# Patient Record
Sex: MALE | Race: WHITE | NOT HISPANIC OR LATINO | Employment: FULL TIME | URBAN - METROPOLITAN AREA
[De-identification: names, ages, dates, MRNs, and addresses within clinical notes are randomized per-mention and may not be internally consistent; named-entity substitution may affect disease eponyms.]

---

## 2018-03-31 ENCOUNTER — HOSPITAL ENCOUNTER (EMERGENCY)
Facility: CLINIC | Age: 33
Discharge: HOME | End: 2018-03-31
Attending: FAMILY MEDICINE | Admitting: FAMILY MEDICINE
Payer: COMMERCIAL

## 2018-03-31 VITALS
SYSTOLIC BLOOD PRESSURE: 145 MMHG | WEIGHT: 240 LBS | TEMPERATURE: 97.2 F | DIASTOLIC BLOOD PRESSURE: 93 MMHG | OXYGEN SATURATION: 95 % | RESPIRATION RATE: 18 BRPM

## 2018-03-31 DIAGNOSIS — H18.892 CORNEAL IRRITATION OF LEFT EYE: ICD-10-CM

## 2018-03-31 PROCEDURE — 90471 IMMUNIZATION ADMIN: CPT | Performed by: FAMILY MEDICINE

## 2018-03-31 PROCEDURE — 99283 EMERGENCY DEPT VISIT LOW MDM: CPT | Mod: Z6 | Performed by: FAMILY MEDICINE

## 2018-03-31 PROCEDURE — 99283 EMERGENCY DEPT VISIT LOW MDM: CPT | Mod: 25 | Performed by: FAMILY MEDICINE

## 2018-03-31 PROCEDURE — 25000125 ZZHC RX 250: Performed by: FAMILY MEDICINE

## 2018-03-31 PROCEDURE — 90715 TDAP VACCINE 7 YRS/> IM: CPT | Performed by: FAMILY MEDICINE

## 2018-03-31 PROCEDURE — 25000128 H RX IP 250 OP 636: Performed by: FAMILY MEDICINE

## 2018-03-31 RX ORDER — TETRACAINE HYDROCHLORIDE 5 MG/ML
1-2 SOLUTION OPHTHALMIC ONCE
Status: COMPLETED | OUTPATIENT
Start: 2018-03-31 | End: 2018-03-31

## 2018-03-31 RX ADMIN — TETRACAINE HYDROCHLORIDE 2 DROP: 5 SOLUTION OPHTHALMIC at 13:25

## 2018-03-31 RX ADMIN — CLOSTRIDIUM TETANI TOXOID ANTIGEN (FORMALDEHYDE INACTIVATED), CORYNEBACTERIUM DIPHTHERIAE TOXOID ANTIGEN (FORMALDEHYDE INACTIVATED), BORDETELLA PERTUSSIS TOXOID ANTIGEN (GLUTARALDEHYDE INACTIVATED), BORDETELLA PERTUSSIS FILAMENTOUS HEMAGGLUTININ ANTIGEN (FORMALDEHYDE INACTIVATED), BORDETELLA PERTUSSIS PERTACTIN ANTIGEN, AND BORDETELLA PERTUSSIS FIMBRIAE 2/3 ANTIGEN 0.5 ML: 5; 2; 2.5; 5; 3; 5 INJECTION, SUSPENSION INTRAMUSCULAR at 13:22

## 2018-03-31 NOTE — ED AVS SNAPSHOT
[facility] Emergency Department    [address] [place] [address]    Phone:  [phone]    Fax:  [phone]                                       Kale Young   MRN: [id number]    Department:  [facility] Emergency Department   Date of Visit:  3/31/2018           After Visit Summary Signature Page     I have received my discharge instructions, and my questions have been answered. I have discussed any challenges I see with this plan with the nurse or doctor.    ..........................................................................................................................................  Patient/Patient Representative Signature      ..........................................................................................................................................  Patient Representative Print Name and Relationship to Patient    ..................................................               ................................................  Date                                            Time    ..........................................................................................................................................  Reviewed by Signature/Title    ...................................................              ..............................................  Date                                                            Time

## 2018-03-31 NOTE — ED AVS SNAPSHOT
[facility] Emergency Department    [address] [place] [address]    Phone:  [phone]    Fax:  [phone]                                       Kale Young   MRN: [id number]    Department:  [facility] Emergency Department   Date of Visit:  3/31/2018           Patient Information     Date Of Birth          1985        Your diagnoses for this visit were:     Corneal irritation of left eye        You were seen by Tank Samaniego MD.      Follow-up Information     Schedule an appointment as soon as possible for a visit with Clinic, [facility].    Why:  For any further concerns    Contact information:    [address], [address]  [place] [address]  [phone]        Discharge References/Attachments     CORNEA, FOREIGN OBJECT IN (ENGLISH)    EYE, FLASH BURN TO (ENGLISH)      24 Hour Appointment Hotline       To make an appointment at any [facility], call [phone] ([phone]). If you don't have a family doctor or clinic, we will help you find one. [facility] ([facility] are conveniently located to serve the needs of you and your family.             Review of your medicines      Notice     You have not been prescribed any medications.            Procedures and tests performed during your visit     Patient care order      Orders Needing Specimen Collection     None      Pending Results     No orders found from 3/29/2018 to 4/1/2018.            Pending Culture Results     No orders found from 3/29/2018 to 4/1/2018.            Pending Results Instructions     If you had any lab results that were not finalized at the time of your Discharge, you can call the ED Lab Result RN at [phone]. You will be contacted by this team for any positive Lab results or changes in treatment. The nurses are available 7 days a week from 10A to 6:30P.  You can leave a message 24 hours per day and they will return your call.        Thank you for choosing [facility]      "  Thank you for choosing [facility] for your care. Our goal is always to provide you with excellent care. Hearing back from our patients is one way we can continue to improve our services. Please take a few minutes to complete the written survey that you may receive in the mail after you visit with us. Thank you!        [employer] Information     [employer] lets you send messages to your doctor, view your test results, renew your prescriptions, schedule appointments and more. To sign up, go to [email or web address] . Click on \"Log in\" on the left side of the screen, which will take you to the Welcome page. Then click on \"Sign up Now\" on the right side of the page.     You will be asked to enter the access code listed below, as well as some personal information. Please follow the directions to create your username and password.     Your access code is: [email or web address]  Expires: 2018  1:30 PM     Your access code will  in 90 days. If you need help or a new code, please call your [facility] clinic or [phone].        Care EveryWhere ID     This is your Care EveryWhere ID. This could be used by other organizations to access your [facility] medical records  [id number]        Equal Access to Services     KIRAN MUÑOZ : Alvina garciao Sodave, waaxda luqadaha, qaybta kaalmada adeegyada, diallo salinas. So [facility] [phone].    ATENCIÓN: Si habla español, tiene a munoz disposición servicios gratuitos de asistencia lingüística. Llame al [phone].    We comply with applicable federal civil rights laws and [place] laws. We do not discriminate on the basis of race, color, national origin, age, disability, sex, sexual orientation, or gender identity.            After Visit Summary       This is your record. Keep this with you and show to your community pharmacist(s) and doctor(s) at your next visit.                  "

## 2018-03-31 NOTE — ED PROVIDER NOTES
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  Patient felt like he had something stuck in his eye.  He took his fingernail and felt like he was pulling pieces out of his eye this morning.  It is feeling a little bit better here now.  Patient does not wear contact lenses.  Patient does not remember when his last tetanus shot was.  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    Problem List:    There are no active problems to display for this patient.       Past Medical History:    History reviewed. No pertinent past medical history.    Past Surgical History:    History reviewed. No pertinent surgical history.    Family History:    No family history on file.    Social History:  Marital Status:    Social History   Substance Use Topics     Smoking status: Never Smoker     Smokeless tobacco: Current User     Alcohol use Yes      Comment: occ        Medications:      No current outpatient prescriptions on file.      Review of Systems   All other systems reviewed and are negative.      Physical Exam   BP: (!) 145/93  Heart Rate: 81  Temp: 97.2  F (36.2  C)  Resp: 18  Weight: 108.9 kg (240 lb)  SpO2: 95 %      Physical Exam   Constitutional: He appears well-developed and well-nourished. No distress.   HENT:   Head: Normocephalic and atraumatic.   Mouth/Throat: No oropharyngeal exudate.   Eyes: Conjunctivae, EOM and lids are normal. Pupils are equal, round, and reactive to light. Lids are everted and swept, no foreign bodies found. Right eye exhibits no exudate. No foreign body present in the right eye. Left eye exhibits no exudate. No foreign body present in the left eye.   Slit lamp exam:       The left eye shows no corneal abrasion, no corneal ulcer, no foreign body and no fluorescein uptake.    Skin: He is not diaphoretic.   Nursing note and vitals reviewed.      ED Course     ED Course     Procedures          Medications   tetracaine (PONTOCAINE) 0.5 % ophthalmic solution 1-2 drop (not administered)     After thorough eye exam, I cannot find any signs of foreign bodies in the eye.  There is no fluorescein uptake.  Eyelids were everted and no foreign bodies noted.  I was irrigated here in the emergency department.  Patient will use ibuprofen as needed for pain and moisturizing drops to help with eye irritation.  Patient will follow-up with ophthalmology if there is still any concerns after a few days.    Assessments & Plan (with Medical Decision Making)  Corneal irritation     I have reviewed the nursing notes.    I have reviewed the findings, diagnosis, plan and need for follow up with the patient.        3/31/2018   [facility] EMERGENCY DEPARTMENT     Tank Samaniego MD  03/31/18 1253

## 2018-03-31 NOTE — ED NOTES
Attempted to irrigate eye. Pt refused stating he does not like anyone messing with his eyes. Supplies sent with pt-wife to irrigate if needed

## 2018-07-22 ENCOUNTER — HOSPITAL ENCOUNTER (EMERGENCY)
Facility: CLINIC | Age: 33
Discharge: HOME | End: 2018-07-22
Attending: PHYSICIAN ASSISTANT | Admitting: PHYSICIAN ASSISTANT
Payer: COMMERCIAL

## 2018-07-22 VITALS
OXYGEN SATURATION: 98 % | SYSTOLIC BLOOD PRESSURE: 143 MMHG | TEMPERATURE: 98.6 F | WEIGHT: 215 LBS | RESPIRATION RATE: 16 BRPM | HEART RATE: 74 BPM | DIASTOLIC BLOOD PRESSURE: 91 MMHG

## 2018-07-22 DIAGNOSIS — S39.012A STRAIN OF LUMBAR REGION, INITIAL ENCOUNTER: ICD-10-CM

## 2018-07-22 PROCEDURE — 99284 EMERGENCY DEPT VISIT MOD MDM: CPT | Performed by: PHYSICIAN ASSISTANT

## 2018-07-22 PROCEDURE — 96372 THER/PROPH/DIAG INJ SC/IM: CPT | Performed by: PHYSICIAN ASSISTANT

## 2018-07-22 PROCEDURE — 25000128 H RX IP 250 OP 636: Performed by: PHYSICIAN ASSISTANT

## 2018-07-22 PROCEDURE — 99284 EMERGENCY DEPT VISIT MOD MDM: CPT | Mod: Z6 | Performed by: PHYSICIAN ASSISTANT

## 2018-07-22 RX ORDER — KETOROLAC TROMETHAMINE 30 MG/ML
60 INJECTION, SOLUTION INTRAMUSCULAR; INTRAVENOUS ONCE
Status: COMPLETED | OUTPATIENT
Start: 2018-07-22 | End: 2018-07-22

## 2018-07-22 RX ORDER — CYCLOBENZAPRINE HCL 10 MG
10 TABLET ORAL 3 TIMES DAILY PRN
Qty: 20 TABLET | Refills: 0 | Status: SHIPPED | OUTPATIENT
Start: 2018-07-22 | End: 2018-07-28

## 2018-07-22 RX ADMIN — KETOROLAC TROMETHAMINE 60 MG: 30 INJECTION, SOLUTION INTRAMUSCULAR at 14:05

## 2018-07-22 NOTE — DISCHARGE INSTRUCTIONS
Please take the Flexeril as needed for muscle spasming. I encourage you to also take ibuprofen 800 mg every 8 hours for pain and anti-inflammatory relief- no ibuprofen until 8 pm tonight because you received toradol here.  Apply ice to the low back and practice gentle stretching and massage to the area.  Return to normal activities as tolerated.  If you develop worsening symptoms please return to the emergency department.    Thank you for choosing [facility] Emergency Department. It was a pleasure taking care of you today. If you have any questions, please call [phone].    Chiquita Rice PA-C    Understanding Lumbosacral Strain    Lumbosacral strain is a medical term for an injury that causes low back pain. The lumbosacral area (low back) is between the bottom of the ribcage and the top of the buttocks. A strain is tearing of muscles and tendons. These tears can be very small but still cause pain.  How a lumbosacral strain happens  Muscles and tendons connected to the spine can be strained in a number of ways:    Sitting or standing in the same position for long periods of time. This can harm the low back over time. Poor posture can make low back pain more likely.    Moving the muscles and tendons past their usual range of motion. This can cause a sudden injury. This can happen when you twist, bend over, or lift something heavy. Not using correct technique for sports or tasks like lifting can make back injury more likely.    Accidents or falls  Lumbosacral strain can be caused by other problems, but these are less common.  Symptoms of lumbosacral strain  Symptoms may include:    Pain in the back, often on one side    Pain that gets worse with movement and gets better with rest    Inability to move as freely as usual    Swelling, slight redness, and skin warmth in the painful area  Treatment for lumbosacral strain  Low back pain often goes away by itself within several weeks. But it often comes back.  Treatment focuses on reducing pain and avoiding further injury. Bed rest is usually not recommended for low back pain. Treatments may include:    Avoiding or changing the action that caused the problem. This helps prevent injuring the tissues again.    Prescription or over-the-counter pain medicines. These help reduce inflammation, swelling, and pain.    Cold or heat packs. These help reduce pain and swelling.    Stretching and other exercises. These improve flexibility and strength.    Physical therapy. This usually includes exercises and other treatments.    Injections of medicine. This may relieve symptoms.  If these treatments do not relieve symptoms, your healthcare provider may order imaging tests to learn more about the problem. Sometimes you may need surgery.  Possible complications of lumbosacral strain  If the cause of the pain is not addressed, symptoms may return or get worse. Follow your healthcare provider s instructions on lifestyle changes and treating your back.     When to call your healthcare provider  Call your healthcare provider right away if you have any of these:    Fever of 100.4 F (38 C) or higher, or as directed    Numbness, tingling, or weakness    Problems with bowel or bladder control, or problems having sex    Pain that does not go away, or gets worse    New symptoms

## 2018-07-22 NOTE — ED TRIAGE NOTES
Pt here with low back pain, lifting on Tuesday, then long car ride, difficulty getting out of bed this morning.

## 2018-07-22 NOTE — ED AVS SNAPSHOT
[facility] Emergency Department    [address] [place] [address]    Phone:  [phone]    Fax:  [phone]                                       Kale Young   MRN: [id number]    Department:  [facility] Emergency Department   Date of Visit:  7/22/2018           After Visit Summary Signature Page     I have received my discharge instructions, and my questions have been answered. I have discussed any challenges I see with this plan with the nurse or doctor.    ..........................................................................................................................................  Patient/Patient Representative Signature      ..........................................................................................................................................  Patient Representative Print Name and Relationship to Patient    ..................................................               ................................................  Date                                            Time    ..........................................................................................................................................  Reviewed by Signature/Title    ...................................................              ..............................................  Date                                                            Time

## 2018-07-22 NOTE — ED AVS SNAPSHOT
[facility] Emergency Department    [address] [place] [address]    Phone:  [phone]    Fax:  [phone]                                       Kale Young   MRN: [id number]    Department:  [facility] Emergency Department   Date of Visit:  7/22/2018           Patient Information     Date Of Birth          1985        Your diagnoses for this visit were:     Strain of lumbar region, initial encounter        You were seen by Chiquita Rice PA-C.      Follow-up Information     Follow up with [facility] Emergency Department.    Specialty:  EMERGENCY MEDICINE    Why:  If symptoms worsen    Contact information:    [address] [place] [address] [phone]    Additional information:    From [address]: Exit at [address] on [address] side of [place]. Turn right on [address]. Turn left at stoplight on [address] to [address]. [facility] will be in view two blocks ahead        Discharge Instructions       Please take the Flexeril as needed for muscle spasming. I encourage you to also take ibuprofen 800 mg every 8 hours for pain and anti-inflammatory relief- no ibuprofen until 8 pm tonight because you received toradol here.  Apply ice to the low back and practice gentle stretching and massage to the area.  Return to normal activities as tolerated.  If you develop worsening symptoms please return to the emergency department.    Thank you for choosing [facility] Emergency Department. It was a pleasure taking care of you today. If you have any questions, please call [phone].    Chiquita Rice PA-C    Understanding Lumbosacral Strain    Lumbosacral strain is a medical term for an injury that causes low back pain. The lumbosacral area (low back) is between the bottom of the ribcage and the top of the buttocks. A strain is tearing of muscles and tendons. These tears can be very small but still cause pain.  How a lumbosacral  strain happens  Muscles and tendons connected to the spine can be strained in a number of ways:    Sitting or standing in the same position for long periods of time. This can harm the low back over time. Poor posture can make low back pain more likely.    Moving the muscles and tendons past their usual range of motion. This can cause a sudden injury. This can happen when you twist, bend over, or lift something heavy. Not using correct technique for sports or tasks like lifting can make back injury more likely.    Accidents or falls  Lumbosacral strain can be caused by other problems, but these are less common.  Symptoms of lumbosacral strain  Symptoms may include:    Pain in the back, often on one side    Pain that gets worse with movement and gets better with rest    Inability to move as freely as usual    Swelling, slight redness, and skin warmth in the painful area  Treatment for lumbosacral strain  Low back pain often goes away by itself within several weeks. But it often comes back. Treatment focuses on reducing pain and avoiding further injury. Bed rest is usually not recommended for low back pain. Treatments may include:    Avoiding or changing the action that caused the problem. This helps prevent injuring the tissues again.    Prescription or over-the-counter pain medicines. These help reduce inflammation, swelling, and pain.    Cold or heat packs. These help reduce pain and swelling.    Stretching and other exercises. These improve flexibility and strength.    Physical therapy. This usually includes exercises and other treatments.    Injections of medicine. This may relieve symptoms.  If these treatments do not relieve symptoms, your healthcare provider may order imaging tests to learn more about the problem. Sometimes you may need surgery.  Possible complications of lumbosacral strain  If the cause of the pain is not addressed, symptoms may return or get worse. Follow your healthcare provider s  instructions on lifestyle changes and treating your back.     When to call your healthcare provider  Call your healthcare provider right away if you have any of these:    Fever of 100.4 F (38 C) or higher, or as directed    Numbness, tingling, or weakness    Problems with bowel or bladder control, or problems having sex    Pain that does not go away, or gets worse    New symptoms         24 Hour Appointment Hotline       To make an appointment at any [facility] clinic, call [phone] ([phone]). If you don't have a family doctor or clinic, we will help you find one. [facility] clinics are conveniently located to serve the needs of you and your family.             Review of your medicines      START taking        Dose / Directions Last dose taken    cyclobenzaprine 10 MG tablet   Commonly known as:  FLEXERIL   Dose:  10 mg   Quantity:  20 tablet        Take 1 tablet (10 mg) by mouth 3 times daily as needed for muscle spasms   Refills:  0          Our records show that you are taking the medicines listed below. If these are incorrect, please call your family doctor or clinic.        Dose / Directions Last dose taken    IBUPROFEN PO   Dose:  800 mg        Take 800 mg by mouth every 8 hours as needed for moderate pain   Refills:  0                Prescriptions were sent or printed at these locations (1 Prescription)                   [facility] [place] [address] [address]    [address] [facility] [address]    Telephone:  [phone]   Fax:  [phone]   Hours:                  E-Prescribed (1 of 1)         cyclobenzaprine (FLEXERIL) 10 MG tablet                Orders Needing Specimen Collection     None      Pending Results     No orders found from 7/20/2018 to 7/23/2018.            Pending Culture Results     No orders found from 7/20/2018 to 7/23/2018.            Pending Results Instructions     If you had any lab results that were not finalized at the time of your Discharge, you  "can call the ED Lab Result RN at [phone]. You will be contacted by this team for any positive Lab results or changes in treatment. The nurses are available 7 days a week from 10A to 6:30P.  You can leave a message 24 hours per day and they will return your call.        Thank you for choosing [facility]       Thank you for choosing [facility] for your care. Our goal is always to provide you with excellent care. Hearing back from our patients is one way we can continue to improve our services. Please take a few minutes to complete the written survey that you may receive in the mail after you visit with us. Thank you!        [employer] Information     [employer] lets you send messages to your doctor, view your test results, renew your prescriptions, schedule appointments and more. To sign up, go to [email or web address] . Click on \"Log in\" on the left side of the screen, which will take you to the Welcome page. Then click on \"Sign up Now\" on the right side of the page.     You will be asked to enter the access code listed below, as well as some personal information. Please follow the directions to create your username and password.     Your access code is: [email or web address]  Expires: 10/20/2018  2:09 PM     Your access code will  in 90 days. If you need help or a new code, please call your [facility] clinic or [phone].        Care EveryWhere ID     This is your Care EveryWhere ID. This could be used by other organizations to access your [facility] medical records  [id number]        Equal Access to Services     KRIAN MUÑOZ : Hadii jasmyn ku hadasho Sonickali, waaxda luqadaha, qaybta kaalmada adeegyada, diallo dunn . So [facility] [phone].    ATENCIÓN: Si habla español, tiene a munoz disposición servicios gratuitos de asistencia lingüística. Llame al [phone].    We comply with applicable federal civil rights laws and [place] laws. We do not discriminate on the basis of race, color, national " origin, age, disability, sex, sexual orientation, or gender identity.            After Visit Summary       This is your record. Keep this with you and show to your community pharmacist(s) and doctor(s) at your next visit.

## 2018-07-22 NOTE — LETTER
July 22, 2018      Kale Young  [address]   [place] [address]        To Whom It May Concern:    Kale Young  was seen on 7/22/18.  Please excuse him from work for the next 2-3 days due to injury.        Sincerely,          Chiquita Rice PA-C

## 2018-07-22 NOTE — ED PROVIDER NOTES
History     Chief Complaint   Patient presents with     Back Pain     HPI  Kale Young is a 33 year old male who presents to the emergency department complaining of back pain.  Patient reports about 5 days ago he was lifting large beams for work.  After that he noticed his back was really tight and sore.  Yesterday he drove from [place] back to here which is about 10 hours in the car ride really tightened up his back further.  When he woke up this morning he could barely get out of bed due to how sore it was.  He took 800 mg ibuprofen last night but otherwise nothing.  He denies any direct injury to the back.  Denies numbness or weakness in legs.  No loss of bowel or bladder control.  No fevers, nausea or vomiting.  No urinary symptoms.    Problem List:    There are no active problems to display for this patient.       Past Medical History:    History reviewed. No pertinent past medical history.    Past Surgical History:    History reviewed. No pertinent surgical history.    Family History:    No family history on file.    Social History:  Marital Status:   [2]  Social History   Substance Use Topics     Smoking status: Never Smoker     Smokeless tobacco: Current User     Alcohol use Yes      Comment: occ        Medications:      cyclobenzaprine (FLEXERIL) 10 MG tablet   IBUPROFEN PO         Review of Systems   All other systems reviewed and are negative.      Physical Exam   BP: (!) 147/94  Pulse: 71  Temp: 98.6  F (37  C)  Resp: 16  Weight: 97.5 kg (215 lb)  SpO2: 98 %      Physical Exam   Constitutional: He is oriented to person, place, and time. He appears well-developed and well-nourished. No distress.   HENT:   Head: Normocephalic and atraumatic.   Eyes: Conjunctivae are normal.   Neck: Normal range of motion.   Pulmonary/Chest: Effort normal. No respiratory distress.   Musculoskeletal:        Cervical back: Normal.        Thoracic back: Normal.        Lumbar back: He exhibits decreased range of  motion, tenderness (area noted) and spasm (area noted). He exhibits no bony tenderness.        Back:    Neurological: He is alert and oriented to person, place, and time.   Skin: Skin is warm. He is not diaphoretic.   Psychiatric: He has a normal mood and affect.   Nursing note and vitals reviewed.      ED Course     ED Course     Procedures    No results found for this or any previous visit (from the past 24 hour(s)).    Medications   ketorolac (TORADOL) injection 60 mg (not administered)       Assessments & Plan (with Medical Decision Making)  Kale Young is a 33 year old male who presented to the ED complaining of back pain.  Developed over the last several days after lifting heavy objects and riding in a vehicle for a long time.  No acute injury.  Vitals unremarkable on arrival.  He had bilateral muscular tenderness to the low back area but otherwise normal strength in lower extremities and normal sensation.  No warning neurological symptoms to suggest indication for imaging.  I think patient likely has a lumbar strain with spasming.  He was given Toradol IM here for acute relief.  He felt comfortable managing things at home with muscle relaxers and continued use of NSAIDs, flexeril prescribed. Also encouraged ice to the low back, gentle stretching and massage.  Anticipate things to improve in the next few days to a week, can follow up in the clinic if symptoms persist.  I went over indications of when to return to the ED.  All questions answered and patient agreeable to plan and to discharge.     I have reviewed the nursing notes.    I have reviewed the findings, diagnosis, plan and need for follow up with the patient.    New Prescriptions    CYCLOBENZAPRINE (FLEXERIL) 10 MG TABLET    Take 1 tablet (10 mg) by mouth 3 times daily as needed for muscle spasms       Final diagnoses:   Strain of lumbar region, initial encounter     Note: Chart documentation done in part with Dragon Voice Recognition software.  Although reviewed after completion, some word and grammatical errors may remain.      7/22/2018   [facility] EMERGENCY DEPARTMENT     Chiquita Rice PA-C  07/22/18 9149

## 2018-07-25 NOTE — ED PROVIDER NOTES
3:17 PM     Patient presents to the ED for filling out of the [place] unemployment insurance forms.  He was evaluated in the ED on 7/22/18 by Chiquita Rice PA-C.  Chiquita is not in the ED for another week, and he was hoping that this form could be filled out prior to then.  With his approval, I opened his chart to review it.  Patient was in for a lumbar strain.  Was given a note for work to be off work for 3 days.  I filled out the unemployment insurance form with the treatment date of 7/22/18 and being  totally unable to perform any type of work through 7/25/2018.  This was filled out given that Chiquita Rice PA-C is not available to fill out the form in a timely fashion.    Electronically signed by Sen Vyas PA-C.      Sen Vyas PA-C  07/25/18 4620

## 2018-08-15 ENCOUNTER — OFFICE VISIT (OUTPATIENT)
Dept: FAMILY MEDICINE | Facility: CLINIC | Age: 33
End: 2018-08-15
Payer: COMMERCIAL

## 2018-08-15 VITALS
RESPIRATION RATE: 16 BRPM | DIASTOLIC BLOOD PRESSURE: 84 MMHG | HEART RATE: 78 BPM | SYSTOLIC BLOOD PRESSURE: 132 MMHG | WEIGHT: 218 LBS | TEMPERATURE: 97 F | OXYGEN SATURATION: 99 %

## 2018-08-15 DIAGNOSIS — S33.5XXA LUMBAR SPRAIN, INITIAL ENCOUNTER: Primary | ICD-10-CM

## 2018-08-15 PROCEDURE — 99213 OFFICE O/P EST LOW 20 MIN: CPT | Performed by: OBSTETRICS & GYNECOLOGY

## 2018-08-15 ASSESSMENT — PAIN SCALES - GENERAL: PAINLEVEL: MILD PAIN (3)

## 2018-08-15 NOTE — MR AVS SNAPSHOT
"              After Visit Summary   8/15/2018    Kale Young    MRN: [id number]           Patient Information     Date Of Birth          1985        Visit Information        Provider Department      8/15/2018 8:50 AM Rafiq Schwarz MD [facility]        Today's Diagnoses     Lumbar sprain, initial encounter    -  1       Follow-ups after your visit        Your next 10 appointments already scheduled     Aug 21, 2018 10:20 AM CDT   [facility]    [address] [place] [id number] [phone]           Bring a current list of meds and any records pertaining to this visit. For Physicals, please bring immunization records and any forms needing to be filled out. Please arrive 10 minutes early to complete paperwork.              Who to contact     If you have questions or need follow up information about today's clinic visit or your schedule please contact [facility] directly at [phone].  Normal or non-critical lab and imaging results will be communicated to you by Ulmarthart, letter or phone within 4 business days after the clinic has received the results. If you do not hear from us within 7 days, please contact the clinic through iSTARt or phone. If you have a critical or abnormal lab result, we will notify you by phone as soon as possible.  Submit refill requests through FatSkunk or call your pharmacy and they will forward the refill request to us. Please allow 3 business days for your refill to be completed.          Additional Information About Your Visit        UlmartharJintronix Information     FatSkunk lets you send messages to your doctor, view your test results, renew your prescriptions, schedule appointments and more. To sign up, go to [email or web address] . Click on \"Log in\" on the left side of the screen, which will take you to the Welcome page. Then click on \"Sign " "up Now\" on the right side of the page.     You will be asked to enter the access code listed below, as well as some personal information. Please follow the directions to create your username and password.     Your access code is: [email or web address]  Expires: 10/20/2018  2:09 PM     Your access code will  in 90 days. If you need help or a new code, please call your [facility] or [phone].        Care EveryWhere ID     This is your Care EveryWhere ID. This could be used by other organizations to access your [place] medical records  [id number]        Your Vitals Were     Pulse Temperature Respirations Pulse Oximetry          78 97  F (36.1  C) (Temporal) 16 99%         Blood Pressure from Last 3 Encounters:   08/15/18 132/84   18 (!) 143/91   18 (!) 145/93    Weight from Last 3 Encounters:   08/15/18 218 lb (98.9 kg)   18 215 lb (97.5 kg)   18 240 lb (108.9 kg)              Today, you had the following     No orders found for display       Primary Care Provider Office Phone # Fax #    [facility] [phone]       [address] [address]        Equal Access to Services     KIRAN MUÑOZ AH: Hadii aad ku hadasho Soomaali, waaxda luqadaha, qaybta kaalmada adeegyada, waxay idiin hayaan roxie dunn . So [facility] [phone].    ATENCIÓN: Si habla español, tiene a munoz disposición servicios gratuitos de asistencia lingüística. Llame al [phone].    We comply with applicable federal civil rights laws and [place] laws. We do not discriminate on the basis of race, color, national origin, age, disability, sex, sexual orientation, or gender identity.            Thank you!     Thank you for choosing [facility]  for your care. Our goal is always to provide you with excellent care. Hearing back from our patients is one way we can continue to improve our services. Please take a few minutes to complete the written survey that you " may receive in the mail after your visit with us. Thank you!             Your Updated Medication List - Protect others around you: Learn how to safely use, store and throw away your medicines at www.disposemymeds.org.          This list is accurate as of 8/15/18 10:45 AM.  Always use your most recent med list.                   Brand Name Dispense Instructions for use Diagnosis    IBUPROFEN PO      Take 800 mg by mouth every 8 hours as needed for moderate pain

## 2018-08-15 NOTE — PROGRESS NOTES
Subjective:  DOI: July 18th 2018  Employer: [employer]  Description of injury:  He was lifting heavy I beams when he felt soreness in his back. He went to ER and was prescribed ibuprofen and flexeril, which helped a lot. Here for followup now. He states that his pain was always confined to the bilateral lower back, not in his legs. No numbness in legs. No motor dysfunction or change in bowel or bladder control.      Now his symptoms are minimal- he uses his back as usual and has occasional pain but overall doing well. Pain markedly decreased.  Back to normal activity.    The past medical history, social history, past surgical history and family history as shown below have been reviewed by me today.  History reviewed. No pertinent past medical history.   No Known Allergies  Current Outpatient Prescriptions   Medication Sig Dispense Refill     IBUPROFEN PO Take 800 mg by mouth every 8 hours as needed for moderate pain       History reviewed. No pertinent surgical history.  Social History     Social History     Marital status:      Spouse name: N/A     Number of children: N/A     Years of education: N/A     Social History Main Topics     Smoking status: Never Smoker     Smokeless tobacco: Current User     Alcohol use Yes      Comment: occ     Drug use: No     Sexual activity: Not Asked     Other Topics Concern     None     Social History Narrative     History reviewed. No pertinent family history.    ROS: A 12 point review of systems was done. Except for what is listed above in the HPI, the systems review is negative .      Objective: Vital signs: Blood pressure 132/84, pulse 78, temperature 97  F (36.1  C), temperature source Temporal, resp. rate 16, weight 218 lb (98.9 kg), SpO2 99 %.    Chest is clear to auscultation.  No wheezes, rales or rhonchi heard.  Cardiac exam is normal with s1, s2, no murmurs or adventitious sounds.Normal rate and rhythm is heard.   The examination of the back reveals  normal   ROM with forward flexion at the waist. except for some limitation of flexion in the lower lumbar muscles- some minimal spasm noted.  there is minimal tenderness to palpation of the lumbar   muscles bilaterally  . SLR testing is  70 degrees on the left leg and  70 degrees on the right leg.  DTRs are 2  + /4+ on the left and right knee . Motor and sensory exams of the legs are normal.           Assessment/Plan:    1.  Bilateral lower back strain with lumbar spasm- markedly improved. He can return to work without restrictions. I demonstrated proper lifting and stretching exercises to him.    2. rechekc prn.      LUCILLE Schwarz MD

## 2018-08-21 ENCOUNTER — OFFICE VISIT (OUTPATIENT)
Dept: FAMILY MEDICINE | Facility: CLINIC | Age: 33
End: 2018-08-21
Payer: COMMERCIAL

## 2018-08-21 VITALS
RESPIRATION RATE: 16 BRPM | BODY MASS INDEX: 34.53 KG/M2 | DIASTOLIC BLOOD PRESSURE: 84 MMHG | WEIGHT: 220 LBS | SYSTOLIC BLOOD PRESSURE: 122 MMHG | OXYGEN SATURATION: 96 % | HEART RATE: 83 BPM | TEMPERATURE: 97 F | HEIGHT: 67 IN

## 2018-08-21 DIAGNOSIS — Z30.8 ENCOUNTER FOR OTHER CONTRACEPTIVE MANAGEMENT: Primary | ICD-10-CM

## 2018-08-21 PROCEDURE — 99214 OFFICE O/P EST MOD 30 MIN: CPT | Performed by: FAMILY MEDICINE

## 2018-08-21 ASSESSMENT — PAIN SCALES - GENERAL: PAINLEVEL: NO PAIN (0)

## 2018-08-21 NOTE — MR AVS SNAPSHOT
"              After Visit Summary   8/21/2018    Kale Young    MRN: [id number]           Patient Information     Date Of Birth          1985        Visit Information        Provider Department      8/21/2018 10:20 AM Dorian Lujan MD [facility]        Today's Diagnoses     Encounter for other contraceptive management    -  1       Follow-ups after your visit        Your next 10 appointments already scheduled     Oct 11, 2018 10:45 AM CDT   PROCEDURE with NL PROC ROOM ONE [facility] ([facility])    [address] [place] [address] [phone]            Oct 11, 2018 11:00 AM CDT   PROCEDURE with Dorian Lujan MD   [facility] ([address] [address] [phone]              Who to contact     If you have questions or need follow up information about today's clinic visit or your schedule please contact [facility] directly at [phone].  Normal or non-critical lab and imaging results will be communicated to you by ApexPeakhart, letter or phone within 4 business days after the clinic has received the results. If you do not hear from us within 7 days, please contact the clinic through Solar Power Limitedt or phone. If you have a critical or abnormal lab result, we will notify you by phone as soon as possible.  Submit refill requests through Drimki or call your pharmacy and they will forward the refill request to us. Please allow 3 business days for your refill to be completed.          Additional Information About Your Visit        ApexPeakharHiberna Information     Drimki lets you send messages to your doctor, view your test results, renew your prescriptions, schedule appointments and more. To sign up, go to [email or web address] . Click on \"Log in\" on the left side of the screen, which will take you to the Welcome page. Then click on \"Sign up Now\" on the right " "side of the page.     You will be asked to enter the access code listed below, as well as some personal information. Please follow the directions to create your username and password.     Your access code is: [email or web address]  Expires: 2018 10:23 AM     Your access code will  in 90 days. If you need help or a new code, please call your [facility] or [phone].        Care EveryWhere ID     This is your Care EveryWhere ID. This could be used by other organizations to access your [place] medical records  [id number]        Your Vitals Were     Pulse Temperature Respirations Height Pulse Oximetry BMI (Body Mass Index)    83 97  F (36.1  C) (Temporal) 16 5' 7.2\" (1.707 m) 96% 34.25 kg/m2       Blood Pressure from Last 3 Encounters:   18 122/84   08/15/18 132/84   18 (!) 143/91    Weight from Last 3 Encounters:   18 220 lb (99.8 kg)   08/15/18 218 lb (98.9 kg)   18 215 lb (97.5 kg)              Today, you had the following     No orders found for display       Primary Care Provider Office Phone # Fax #    [facility] [phone]       [address] [facility] [address]        Equal Access to Services     KIRAN MUÑOZ AH: Hadii jasmyn vazquez hadasho Sonickali, waaxda luqadaha, qaybta kaalmada michelle, diallo salinas. So [facility] [phone].    ATENCIÓN: Si habla español, tiene a munoz disposición servicios gratuitos de asistencia lingüística. Llclaudia al [phone].    We comply with applicable federal civil rights laws and [place] laws. We do not discriminate on the basis of race, color, national origin, age, disability, sex, sexual orientation, or gender identity.            Thank you!     Thank you for choosing [facility]  for your care. Our goal is always to provide you with excellent care. Hearing back from our patients is one way we can continue to improve our services. Please take a few minutes to complete the " written survey that you may receive in the mail after your visit with us. Thank you!             Your Updated Medication List - Protect others around you: Learn how to safely use, store and throw away your medicines at www.disposemymeds.org.      Notice  As of 8/21/2018 11:47 AM    You have not been prescribed any medications.

## 2018-08-21 NOTE — NURSING NOTE
Chief Complaint   Patient presents with     Consult     vasectomy     Health Maintenance Due   Topic Date Due     PHQ-2 Q1 YR  06/20/1999     HIV SCREEN (SYSTEM ASSIGNED)  06/20/2005     Noni Rojo, CMA

## 2018-08-21 NOTE — PROGRESS NOTES
SUBJECTIVE:   Kale Young is a 33 year old male who presents to clinic today for the following health issues:    Concern - Vasectomy    SUBJECTIVE:  This 33 year old male is in for a vasectomy consultation.  He   and his partner have decided they don't want to have   any more children.  They have decided that he will have the sterilization   procedure instead of her.  Patient was given   information to read prior to the consultation.           We talked about the risks and benefits of the   vasectomy; risks being that of potential for bleeding,   infection, postoperative discomfort immediately which   can last for a number of weeks afterwards, also the   development of spermatoceles or sperm granulomas,   epididymal cysts and the possibility of failure of the   procedure producing failed attempt at sterilization.   Number quoted was 1 out of 1000 risk of failure.  The pt was asked to shave below the penis.  He was told to bring in tight underwear to wear imeadiately  post op.  Post op care and follow-up was discussed.  All questions were answered.      Also mentioned to the patient that there is some   literature out there that suggests men who have   vasectomies are at increased risk for prostate cancer;   however, this literature is inconclusive and   controversial.  It is recommended that men who have   vasectomies should have annual prostate exams through   the rectum yearly after age 40 and also may benefit   from a screening prostatic specific antigen test after   age 40.  We also discussed signs and symptoms of   prostatic enlargement or prostatic problems.          I went through the procedure with the patient, how it   is done; a midline incision in the scrotum measuring   approximately 1/4 inch in length, with the vas deferens   brought up through this incision,  dissected free and a   small portion is removed.  Each end is then tied with   an absorbable suture, cauterized and then the proximal  "  or distal end is buried away from the other.  Patient   had no questions when it came to the procedure itself.    I did show him pictures and diagrams of the procedure.    I showed him where a potential spermatocele or sperm   granuloma can occur.           Again, the patient had no further questions for me.      OBJECTIVE:  On exam, this is a well-developed, well-nourished white   male.    /84 (BP Location: Right arm, Patient Position: Sitting, Cuff Size: Adult Large)  Pulse 83  Temp 97  F (36.1  C) (Temporal)  Resp 16  Ht 5' 7.2\" (1.707 m)  Wt 220 lb (99.8 kg)  SpO2 96%  BMI 34.25 kg/m2        Exam reveals a normal circumcised penis, no lesions.    Testes are descended bilaterally.  Exam was limited to   the genitalia.  Both vas deferens were easily   identified.  No other abnormalities were noted.          ASSESSMENT:  Undesired fertility in an adult male, requesting   permanent sterilization.         PLAN:  He will schedule a vasectomy at his convenience for   either the last appointment of the morning or afternoon   on a Thursday or Friday.  He is aware that he will need to take the entire weekend off and have essentially   bedrest for two days with ice packs 20-30 minutes out   of every hour and ibuprofen for discomfort.  I gave him   a prescription for ibuprofen 800 mg. to take every six   to eight hours with food for two weeks.  He will take   one tablet half an hour before the procedure along with   Valium 10 mg.  If he has any further questions, he will   contact me prior to the procedure or ask me on the day   of the procedure.  He also is aware that he will need   to bring a specimen after 20 ejaculations to make sure   that he has cleared the storage vesicles of any   residual sperm and to make sure that he is sterile.      Dorian Lujan MD      I spent 25 minutes with this patient over 50% of the time was in healthcare counseling, careplan development, strategies for partnering in " keeping this patient healthy and appropriate referrals and follow up      Scheduled for 11:00 AM on 10/11/18

## 2018-10-11 ENCOUNTER — APPOINTMENT (OUTPATIENT)
Dept: FAMILY MEDICINE | Facility: CLINIC | Age: 33
End: 2018-10-11
Payer: COMMERCIAL

## 2018-10-11 ENCOUNTER — OFFICE VISIT (OUTPATIENT)
Dept: FAMILY MEDICINE | Facility: CLINIC | Age: 33
End: 2018-10-11
Payer: COMMERCIAL

## 2018-10-11 VITALS
OXYGEN SATURATION: 97 % | HEART RATE: 66 BPM | WEIGHT: 228 LBS | SYSTOLIC BLOOD PRESSURE: 128 MMHG | TEMPERATURE: 97.1 F | BODY MASS INDEX: 35.5 KG/M2 | DIASTOLIC BLOOD PRESSURE: 76 MMHG | RESPIRATION RATE: 16 BRPM

## 2018-10-11 DIAGNOSIS — Z30.2 ENCOUNTER FOR VASECTOMY: ICD-10-CM

## 2018-10-11 DIAGNOSIS — Z23 NEED FOR PROPHYLACTIC VACCINATION AND INOCULATION AGAINST INFLUENZA: Primary | ICD-10-CM

## 2018-10-11 PROCEDURE — 90471 IMMUNIZATION ADMIN: CPT | Performed by: FAMILY MEDICINE

## 2018-10-11 PROCEDURE — 55250 REMOVAL OF SPERM DUCT(S): CPT | Performed by: FAMILY MEDICINE

## 2018-10-11 PROCEDURE — 88302 TISSUE EXAM BY PATHOLOGIST: CPT | Performed by: FAMILY MEDICINE

## 2018-10-11 PROCEDURE — 90686 IIV4 VACC NO PRSV 0.5 ML IM: CPT | Performed by: FAMILY MEDICINE

## 2018-10-11 ASSESSMENT — PAIN SCALES - GENERAL: PAINLEVEL: NO PAIN (0)

## 2018-10-11 NOTE — PROGRESS NOTES
Injectable Influenza Immunization Documentation    1.  Is the person to be vaccinated sick today?   No    2. Does the person to be vaccinated have an allergy to a component   of the vaccine?   No  Egg Allergy Algorithm Link    3. Has the person to be vaccinated ever had a serious reaction   to influenza vaccine in the past?   No    4. Has the person to be vaccinated ever had Guillain-Barré syndrome?   No    Form completed by MP/MA             The patient comes in today for a vasectomy.  The   patient and his significant other have previously been   in and we discussed the other options for birth   control, the risks and benefits of the procedure.    Details of those risks and benefits have been noted in   the consent form which has been signed.  This includes   the potential for bleeding, potential for infection.    Data presented suggesting an increased risk of prostate   carcinoma and potential for sperm granuloma formation.         The patient was placed in a supine position on the   procedure table.  Prep was done by the patient at home.    Sterilely prepped and draped in the usual fashion.  The   left vas was first identified and brought up to the   midline raphae where a small wheal of Lidocaine with   epinephrine was placed in the skin overlying the vas   and further anesthesia was injected in to the vas   sheath.  The no-scalpel dissecting instrument was then   used to puncture the skin and dissect out the vas free   of adventitial tissue.  The no-scalpel vas clamp was   then used to grasp the vas.  Skin bleeders were   cauterized with electrocautery as necessary.  When a   segment of vas was clearly freed up, a .5-1 cm. segment   of the vas was then removed and the proximal and distal   ends were ligated with 4-0 Vicryl.  The distal end was   then buried with pursestring suture.  Any bleeders were   then cauterized and/or ligated to control hemorrhage.    When the sterile field was completely dry, it was    returned to the scrotal sac.           Attention was then turned to the opposite side and   proceeded in similar fashion to obtain specimen.  The   vas was brought up to the same opening, injected with   Lidocaine along the vas sheath and grasped with the   clamp, and proceeded in the above fashion.  A 3-0   Chromic suture was used for the scrotal skin incision   and a single interrupted suture was used to   re-approximate the skin edges.         ASSESSMENT:  1)  Male sterilization via vasectomy.          PLAN:  Patient tolerated the procedure quite well.  He will   use Ibuprofen 800 mg. p.o. t.i.d. with food.  Ice to   the scrotum 30 minutes at a time every two to three   hours for the next two to three days.  No heavy lifting   for three days.  The patient will bring in a sample of   semen for analysis after 10-12 ejaculations.                          Dorian Lujan MD

## 2018-10-11 NOTE — MR AVS SNAPSHOT
"              After Visit Summary   10/11/2018    Kale Young    MRN: [id number]           Patient Information     Date Of Birth          1985        Visit Information        Provider Department      10/11/2018 11:00 AM Dorian Lujan MD [facility]        Today's Diagnoses     Need for prophylactic vaccination and inoculation against influenza    -  1    Encounter for vasectomy           Follow-ups after your visit        Who to contact     If you have questions or need follow up information about today's clinic visit or your schedule please contact [facility] directly at [phone].  Normal or non-critical lab and imaging results will be communicated to you by testhubhart, letter or phone within 4 business days after the clinic has received the results. If you do not hear from us within 7 days, please contact the clinic through [facility] or phone. If you have a critical or abnormal lab result, we will notify you by phone as soon as possible.  Submit refill requests through "[employer] or call your pharmacy and they will forward the refill request to us. Please allow 3 business days for your refill to be completed.          Additional Information About Your Visit        MyChart Information     "[employer] lets you send messages to your doctor, view your test results, renew your prescriptions, schedule appointments and more. To sign up, go to [email or web address] . Click on \"Log in\" on the left side of the screen, which will take you to the Welcome page. Then click on \"Sign up Now\" on the right side of the page.     You will be asked to enter the access code listed below, as well as some personal information. Please follow the directions to create your username and password.     Your access code is: [email or web address]  Expires: 2019 10:35 AM     Your access code will  in 90 days. If you need help or a new code, please call your [facility] or [phone].        Care " EveryWhere ID     This is your Care EveryWhere ID. This could be used by other organizations to access your [place] medical records  [id number]        Your Vitals Were     Pulse Temperature Respirations Pulse Oximetry BMI (Body Mass Index)       66 97.1  F (36.2  C) (Temporal) 16 97% 35.5 kg/m2        Blood Pressure from Last 3 Encounters:   10/11/18 128/76   08/21/18 122/84   08/15/18 132/84    Weight from Last 3 Encounters:   10/11/18 228 lb (103.4 kg)   08/21/18 220 lb (99.8 kg)   08/15/18 218 lb (98.9 kg)              We Performed the Following     FLU VACCINE, SPLIT VIRUS, IM (QUADRIVALENT) [85051]- >3 YRS     Surgical pathology exam     Vaccine Administration, Initial [23361]     VASECTOMY UNILAT/BILAT W POSTOP SEMEN          Today's Medication Changes          These changes are accurate as of 10/11/18  1:59 PM.  If you have any questions, ask your nurse or doctor.               Start taking these medicines.        Dose/Directions    acetaminophen-codeine 300-30 MG per tablet   Commonly known as:  TYLENOL #3   Used for:  Encounter for vasectomy   Started by:  Dorian Lujan MD        Dose:  1 tablet   Take 1 tablet by mouth every 6 hours as needed for severe pain   Quantity:  30 tablet   Refills:  0            Where to get your medicines      Some of these will need a paper prescription and others can be bought over the counter.  Ask your nurse if you have questions.     Bring a paper prescription for each of these medications     acetaminophen-codeine 300-30 MG per tablet               Information about OPIOIDS     PRESCRIPTION OPIOIDS: WHAT YOU NEED TO KNOW   We gave you an opioid (narcotic) pain medicine. It is important to manage your pain, but opioids are not always the best choice. You should first try all the other options your care team gave you. Take this medicine for as short a time (and as few doses) as possible.    Some activities can increase your pain, such as bandage changes or therapy  sessions. It may help to take your pain medicine 30 to 60 minutes before these activities. Reduce your stress by getting enough sleep, working on hobbies you enjoy and practicing relaxation or meditation. Talk to your care team about ways to manage your pain beyond prescription opioids.    These medicines have risks:    DO NOT drive when on new or higher doses of pain medicine. These medicines can affect your alertness and reaction times, and you could be arrested for driving under the influence (DUI). If you need to use opioids long-term, talk to your care team about driving.    DO NOT operate heavy machinery    DO NOT do any other dangerous activities while taking these medicines.    DO NOT drink any alcohol while taking these medicines.     If the opioid prescribed includes acetaminophen, DO NOT take with any other medicines that contain acetaminophen. Read all labels carefully. Look for the word  acetaminophen  or  Tylenol.  Ask your pharmacist if you have questions or are unsure.    You can get addicted to pain medicines, especially if you have a history of addiction (chemical, alcohol or substance dependence). Talk to your care team about ways to reduce this risk.    All opioids tend to cause constipation. Drink plenty of water and eat foods that have a lot of fiber, such as fruits, vegetables, prune juice, apple juice and high-fiber cereal. Take a laxative (Miralax, milk of magnesia, Colace, Senna) if you don t move your bowels at least every other day. Other side effects include upset stomach, sleepiness, dizziness, throwing up, tolerance (needing more of the medicine to have the same effect), physical dependence and slowed breathing.    Store your pills in a secure place, locked if possible. We will not replace any lost or stolen medicine. If you don t finish your medicine, please throw away (dispose) as directed by your pharmacist. The [place] Pollution [employer] has more information about safe  disposal: [email or web address]         Primary Care Provider Office Phone # Fax #    [facility] [phone] [address] [facility] [address]        Equal Access to Services     KIRAN MUÑOZ : Alvina vazquez hadasho Soomaali, waaxda luqadaha, qaybta kaalmada adeegyada, waxay reganin haybreannan roxie summerdallas [place]. So [facility] [phone].    ATENCIÓN: Si habla español, tiene a munoz disposición servicios gratuitos de asistencia lingüística. Emily al [phone].    We comply with applicable federal civil rights laws and [place] laws. We do not discriminate on the basis of race, color, national origin, age, disability, sex, sexual orientation, or gender identity.            Thank you!     Thank you for choosing [facility]  for your care. Our goal is always to provide you with excellent care. Hearing back from our patients is one way we can continue to improve our services. Please take a few minutes to complete the written survey that you may receive in the mail after your visit with us. Thank you!             Your Updated Medication List - Protect others around you: Learn how to safely use, store and throw away your medicines at www.disposemymeds.org.          This list is accurate as of 10/11/18  1:59 PM.  Always use your most recent med list.                   Brand Name Dispense Instructions for use Diagnosis    acetaminophen-codeine 300-30 MG per tablet    TYLENOL #3    30 tablet    Take 1 tablet by mouth every 6 hours as needed for severe pain    Encounter for vasectomy

## 2018-10-11 NOTE — NURSING NOTE
Chief Complaint   Patient presents with     Vasectomy   Prior to injection verified patient identity using patient's name and date of birth.   Patient instructed to remain in clinic for 20 minutes afterwards, and to report any adverse reaction to me immediately.  Carmita Szymanski MA

## 2018-10-17 LAB — COPATH REPORT: NORMAL

## 2019-11-18 ENCOUNTER — IMMUNIZATION (OUTPATIENT)
Dept: FAMILY MEDICINE | Facility: CLINIC | Age: 34
End: 2019-11-18
Payer: COMMERCIAL

## 2019-11-18 PROCEDURE — 90686 IIV4 VACC NO PRSV 0.5 ML IM: CPT

## 2019-11-18 PROCEDURE — 90471 IMMUNIZATION ADMIN: CPT

## 2020-10-22 ENCOUNTER — IMMUNIZATION (OUTPATIENT)
Dept: FAMILY MEDICINE | Facility: CLINIC | Age: 35
End: 2020-10-22
Payer: COMMERCIAL

## 2020-10-22 PROCEDURE — 90686 IIV4 VACC NO PRSV 0.5 ML IM: CPT

## 2020-10-22 PROCEDURE — 90471 IMMUNIZATION ADMIN: CPT

## 2021-01-03 ENCOUNTER — HEALTH MAINTENANCE LETTER (OUTPATIENT)
Age: 36
End: 2021-01-03

## 2021-10-10 ENCOUNTER — HEALTH MAINTENANCE LETTER (OUTPATIENT)
Age: 36
End: 2021-10-10

## 2022-01-29 ENCOUNTER — HEALTH MAINTENANCE LETTER (OUTPATIENT)
Age: 37
End: 2022-01-29

## 2022-09-18 ENCOUNTER — HEALTH MAINTENANCE LETTER (OUTPATIENT)
Age: 37
End: 2022-09-18

## 2023-05-06 ENCOUNTER — HEALTH MAINTENANCE LETTER (OUTPATIENT)
Age: 38
End: 2023-05-06

## 2024-01-25 ENCOUNTER — VIRTUAL VISIT (OUTPATIENT)
Dept: FAMILY MEDICINE | Facility: OTHER | Age: 39
End: 2024-01-25
Payer: COMMERCIAL

## 2024-01-25 DIAGNOSIS — J02.9 SORE THROAT: ICD-10-CM

## 2024-01-25 DIAGNOSIS — J02.0 STREPTOCOCCAL SORE THROAT: Primary | ICD-10-CM

## 2024-01-25 PROCEDURE — 99203 OFFICE O/P NEW LOW 30 MIN: CPT | Mod: 95 | Performed by: PHYSICIAN ASSISTANT

## 2024-01-25 RX ORDER — AMOXICILLIN 500 MG/1
500 CAPSULE ORAL 2 TIMES DAILY
Qty: 20 CAPSULE | Refills: 0 | Status: SHIPPED | OUTPATIENT
Start: 2024-01-25 | End: 2024-02-04

## 2024-01-25 RX ORDER — ACETAMINOPHEN 500 MG
500-1000 TABLET ORAL EVERY 6 HOURS PRN
COMMUNITY

## 2024-01-25 ASSESSMENT — ANXIETY QUESTIONNAIRES
7. FEELING AFRAID AS IF SOMETHING AWFUL MIGHT HAPPEN: NOT AT ALL
7. FEELING AFRAID AS IF SOMETHING AWFUL MIGHT HAPPEN: NOT AT ALL
4. TROUBLE RELAXING: NOT AT ALL
1. FEELING NERVOUS, ANXIOUS, OR ON EDGE: NOT AT ALL
GAD7 TOTAL SCORE: 0
3. WORRYING TOO MUCH ABOUT DIFFERENT THINGS: NOT AT ALL
IF YOU CHECKED OFF ANY PROBLEMS ON THIS QUESTIONNAIRE, HOW DIFFICULT HAVE THESE PROBLEMS MADE IT FOR YOU TO DO YOUR WORK, TAKE CARE OF THINGS AT HOME, OR GET ALONG WITH OTHER PEOPLE: NOT DIFFICULT AT ALL
6. BECOMING EASILY ANNOYED OR IRRITABLE: NOT AT ALL
GAD7 TOTAL SCORE: 0
GAD7 TOTAL SCORE: 0
2. NOT BEING ABLE TO STOP OR CONTROL WORRYING: NOT AT ALL
5. BEING SO RESTLESS THAT IT IS HARD TO SIT STILL: NOT AT ALL
8. IF YOU CHECKED OFF ANY PROBLEMS, HOW DIFFICULT HAVE THESE MADE IT FOR YOU TO DO YOUR WORK, TAKE CARE OF THINGS AT HOME, OR GET ALONG WITH OTHER PEOPLE?: NOT DIFFICULT AT ALL

## 2024-01-25 ASSESSMENT — ENCOUNTER SYMPTOMS: SORE THROAT: 1

## 2024-01-25 NOTE — PROGRESS NOTES
"Instructions Relayed to Patient by Virtual Roomer:     Patient is active on [employer]:   Relayed following to patient: \"It looks like you are active on [employer], are you able to join the visit this way? If not, do you need us to send you a link now or would you like your provider to send a link via text or email when they are ready to initiate the visit?\"    Reminded patient to ensure they were logged on to virtual visit by arrival time listed. Documented in appointment notes if patient had flexibility to initiate visit sooner than arrival time. If pediatric virtual visit, ensured pediatric patient along with parent/guardian will be present for video visit.     Patient offered the website [email or web address] and/or phone number to [facility] Help line: [phone]    Charly is a 38 year old who is being evaluated via a billable video visit.      How would you like to obtain your AVS? [employer]  If the video visit is dropped, the invitation should be resent by: Text to cell phone: [phone]      Assessment & Plan     ICD-10-CM    1. Streptococcal sore throat  J02.0 amoxicillin (AMOXIL) 500 MG capsule      2. Sore throat  J02.9         - Patient with exposure to family all positive for strep and all on Amoxicillin   - Patient meets [facility] criteria for treatment so will forgo testing - Discussed antibiotic use, duration, and side effects  - Discussed over the counter symptomatic cares as needed   - Discussed containing bacteria with changing toothbrush and avoiding sharing   - Hand out given     Review of the result(s) of each unique test - None     Diagnosis or treatment significantly limited by social determinants of health - None     10 minutes spent on the date of the encounter doing chart review, history and exam, documentation and further activities as noted above    The patient indicates understanding of these issues and agrees with the plan.    Follow up: TIAGO Parnell, " DENIZ  [facility]       Nehemiah Parks is a 38 year old, presenting for the following health issues:  Pharyngitis        1/25/2024    10:07 AM   Additional Questions   Roomed by Aris ZHAO   Accompanied by Self     Pharyngitis     History of Present Illness       Reason for visit:  Strap Concern        Acute Illness  Acute illness concerns: Sore throat  Onset/Duration: 2 days ago  Symptoms:  Fever: YES, didn't check just felt it   Chills/Sweats: No  Headache (location?): YES  Sinus Pressure: No  Conjunctivitis:  No  Ear Pain: no  Rhinorrhea: No  Congestion: No  Sore Throat: YES  Cough: YES-non-productive  Wheeze: No  Decreased Appetite: No  Nausea: No  Vomiting: No  Diarrhea: No  Dysuria/Freq.: No  Dysuria or Hematuria: No  Fatigue/Achiness: YES  Sick/Strep Exposure: YES- Strep - daughter and wife both positive   Therapies tried and outcome: Tylenol          Review of Systems  Constitutional, HEENT, cardiovascular, pulmonary, gi and gu systems are negative, except as otherwise noted.      Objective       Vitals:  No vitals were obtained today due to virtual visit.    Physical Exam   GENERAL: alert and no distress  EYES: Eyes grossly normal to inspection.  No discharge or erythema, or obvious scleral/conjunctival abnormalities.  RESP: No audible wheeze, cough, or visible cyanosis.    SKIN: Visible skin clear. No significant rash, abnormal pigmentation or lesions.  NEURO: Cranial nerves grossly intact.  Mentation and speech appropriate for age.  PSYCH: Appropriate affect, tone, and pace of words    Diagnostics; none       Video-Visit Details    Type of service:  Video Visit   Originating Location (pt. Location): Home  Distant Location (provider location):  Off-site  Platform used for Video Visit: Alem  Signed Electronically by: Nella Parnell PA-C

## 2024-07-14 ENCOUNTER — HEALTH MAINTENANCE LETTER (OUTPATIENT)
Age: 39
End: 2024-07-14

## 2025-07-19 ENCOUNTER — HEALTH MAINTENANCE LETTER (OUTPATIENT)
Age: 40
End: 2025-07-19